# Patient Record
Sex: FEMALE | Race: WHITE | NOT HISPANIC OR LATINO | Employment: OTHER | URBAN - METROPOLITAN AREA
[De-identification: names, ages, dates, MRNs, and addresses within clinical notes are randomized per-mention and may not be internally consistent; named-entity substitution may affect disease eponyms.]

---

## 2017-06-08 ENCOUNTER — TRANSCRIBE ORDERS (OUTPATIENT)
Dept: PHYSICAL THERAPY | Facility: HOSPITAL | Age: 72
End: 2017-06-08

## 2017-06-08 DIAGNOSIS — M54.9 BACK PAIN, UNSPECIFIED BACK LOCATION, UNSPECIFIED BACK PAIN LATERALITY, UNSPECIFIED CHRONICITY: Primary | ICD-10-CM

## 2017-07-07 ENCOUNTER — HOSPITAL ENCOUNTER (OUTPATIENT)
Dept: PHYSICAL THERAPY | Facility: HOSPITAL | Age: 72
Setting detail: THERAPIES SERIES
Discharge: HOME | End: 2017-07-07

## 2017-07-07 DIAGNOSIS — M54.42 ACUTE LEFT-SIDED LOW BACK PAIN WITH LEFT-SIDED SCIATICA: Primary | ICD-10-CM

## 2017-07-07 PROCEDURE — 97162 PT EVAL MOD COMPLEX 30 MIN: CPT | Performed by: PHYSICAL THERAPIST

## 2017-07-07 PROCEDURE — G8979 MOBILITY GOAL STATUS: HCPCS | Performed by: PHYSICAL THERAPIST

## 2017-07-07 PROCEDURE — G8978 MOBILITY CURRENT STATUS: HCPCS | Performed by: PHYSICAL THERAPIST

## 2017-07-07 PROCEDURE — 97110 THERAPEUTIC EXERCISES: CPT | Performed by: PHYSICAL THERAPIST

## 2017-07-07 NOTE — THERAPY TREATMENT NOTE
Outpatient Physical Therapy Ortho Initial Evaluation  [facility]     Patient Name: Nilsa Lee  : 1945  MRN: [id number]  Today's Date: 2017      Visit Date: 2017    There is no problem list on file for this patient.       No past medical history on file.     No past surgical history on file.    Visit Dx:     ICD-10-CM ICD-9-CM   1. Acute left-sided low back pain with left-sided sciatica M54.42 724.2     724.3             Patient History       17 0900          History    Chief Complaint Pain  -DM      Type of Pain Back pain   with some left hip pain  -DM      Date Current Problem(s) Began 17  -DM      Brief Description of Current Complaint While in [place], fell in the tube while getting onto the train.  As the train began moving she had not gotten to her seat.  She fell onto her left hip and had immediate back pain.  She was assisted up and able to walk off the train.  Took 2 Advil with some decrease in pain.  They continued on their thrip for 2 1/2 more weeks.  Returned  and went to MD on  and got referral for PT.   -DM      Patient/Caregiver Goals Relieve pain;Improve mobility;Return to prior level of function;Know what to do to help the symptoms  -DM      Current Tobacco Use none  -DM      Smoking Status none  -DM      Patient's Rating of General Health Good  -DM      How has patient tried to help current problem? Took Advil.  -DM      Pain     Pain Location Back;Hip   More on the left than ri  ght.  -DM      Pain at Present 4  -DM      Pain at Best 0  -DM      Pain at Worst 5  -DM      What Performance Factors Make the Current Problem(s) WORSE? Moving around, worse after being sedentary, some pain on left hip with stairs ( lives in quad level house)  -DM      What Performance Factors Make the Current Problem(s) BETTER? Rest  -DM      Is your sleep disturbed? No   takes a while to get settled.  -DM      What position do you sleep in? Supine   or sidelying with  legs stacked.  -DM      Fall Risk Assessment    Any falls in the past year: Yes  -DM      Number of falls reported in the last 12 months 1  -DM      Factors that contributed to the fall: --   on a train that moved prior to sitting.  -DM      Does patient have a fear of falling No  -DM      Daily Activities    Are you able to read Yes  -DM      Are you able to write Yes  -DM      How does patient learn best? Listening;Reading;Demonstration;Pictures/Video  -DM      Teaching needs identified Home Exercise Program;Management of Condition  -DM      Does patient have problems with the following? None  -DM      Barriers to learning None  -DM      Pt Participated in POC and Goals Yes  -DM      Safety    Are you being hurt, hit, or frightened by anyone at home or in your life? No  -DM      Are you being neglected by a caregiver No  -DM        User Key  (r) = Recorded By, (t) = Taken By, (c) = Cosigned By    Initials Name Provider Type    DM Génesis Yeung, PT Physical Therapist                PT Ortho       07/07/17 0900    Posture/Observations    Posture/Observations Comments FH, rounded shoulders, decreased lordosis  -DM    DTR- Lower Quarter Clearing    Patellar tendon (L2-4) Bilateral:;2- Normal response  -DM    Achilles tendon (S1-2) Bilateral:;1- Minimal response  -DM    Neural Tension Signs- Lower Quarter Clearing    Slump Negative  -DM    Well Slump Negative  -DM    SLR Negative  -DM    Prone knee flexion Negative  -DM    Myotomal Screen- Lower Quarter Clearing    Hip flexion (L2) 4- (Good -)  -DM    Knee extension (L3) 4 (Good)  -DM    Ankle DF (L4) 5 (Normal)  -DM    Great toe extension (L5) 5 (Normal)  -DM    Ankle PF (S1) 5 (Normal)  -DM    Knee flexion (S2) 4 (Good)  -DM    Cervical Palpation    Rhomboids Left:;Guarded/taut;Tender  -DM    Lower Traps Left:;Tender;Guarded/taut  -DM    ROM (Range of Motion)    General ROM no range of motion deficits identified   in hip  -DM    Trunk    Flexion AROM --   decreased  50%  -DM    Extension AROM --   decreased 25% and felt good  -DM    Left Lateral Flexion AROM --   Decreased 50% with stiffness  -DM    Right Lateral Flexion AROM --   Decreased 50% with stiffness  -DM      User Key  (r) = Recorded By, (t) = Taken By, (c) = Cosigned By    Initials Name Provider Type    ALEJANDRA Yeung, PT Physical Therapist                                PT OP Goals       07/07/17 1600       PT Short Term Goals    STG Date to Achieve 08/05/17  -DM     STG 1 Pt will be independent with initial ROM and flexibility for improved mobility with function.  -DM     STG 1 Progress New  -DM     STG 2 Pt will report no radicular symptoms with all activities.  -DM     STG 2 Progress New  -DM     STG 3 Pt will be able to perform self care tasks without increased pain.  -DM     STG 3 Progress New  -DM     Long Term Goals    LTG Date to Achieve 08/14/17  -DM     LTG 1 Pt will be independent with lumbar stabilization and LE strengthening for return to her previous activity level.  -DM     LTG 1 Progress New  -DM     LTG 2 Pt will demonstrate improved lumbar ROM by 25% for ease with ADL's and function.  -DM     LTG 2 Progress New  -DM     LTG 3 Pt will return to walking community distances without increased pain.  -DM     LTG 3 Progress New  -DM     LTG 4 Pt will report improved function via Oswestry from 38% to 19% or less disability.  -DM     LTG 4 Progress New  -DM     Time Calculation    PT Goal Re-Cert Due Date 08/05/17  -DM       User Key  (r) = Recorded By, (t) = Taken By, (c) = Cosigned By    Initials Name Provider Type    ALEJANDRA Yeung, PT Physical Therapist                PT Assessment/Plan       07/07/17 1624       PT Assessment    Functional Limitations Limitations in community activities;Limitations in functional capacity and performance;Performance in leisure activities;Performance in self-care ADL  -DM     Impairments Pain;Poor body mechanics;Muscle strength;Range of motion  -DM     Please refer  to paper survey for additional self-reported information Yes  -DM     Rehab Potential Good  -DM     Patient/caregiver participated in establishment of treatment plan and goals Yes  -DM     Patient would benefit from skilled therapy intervention Yes  -DM     PT Plan    PT Frequency 2x/week  -DM     Predicted Duration of Therapy Intervention (days/wks) 4-5 weeks  -DM     Planned CPT's? PT EVAL MOD COMPLELITY: 64108;PT THER PROC EA 15 MIN: 78172;PT HOT OR COLD PACK TREAT MCARE;PT ELECTRICAL STIM UNATTEND: ;PT MANUAL THERAPY EA 15 MIN: 79986;PT NEUROMUSC RE-EDUCATION EA 15 MIN: 39566;PT ULTRASOUND EA 15 MIN: 64560  -DM     Physical Therapy Interventions (Optional Details) neuromuscular re-education;modalities;manual therapy techniques;lumbar stabilization;home exercise program;ROM (Range of Motion);strengthening;stretching;taping  -DM     PT Plan Comments Progress in lumbar stabilization exercises.   -DM       User Key  (r) = Recorded By, (t) = Taken By, (c) = Cosigned By    Initials Name Provider Type    ALEJANDRA Yeung, JESSICA Physical Therapist                  Exercises       07/07/17 1200          Exercise 1    Exercise Name 1 Instructed in and performed LTR, Bridging, SKTC, supine piriformis stretch,  figure 4 piriformis stretch  -DM      Cueing 1 Verbal;Tactile;Demo  -DM        User Key  (r) = Recorded By, (t) = Taken By, (c) = Cosigned By    Initials Name Provider Type    ALEJANDRA Yeung, JESSICA Physical Therapist                              Outcome Measures       07/07/17 1600          Modified Oswestry    Modified Oswestry Score/Comments 38%  -DM      Functional Assessment    Outcome Measure Options Modifed Owestry  -DM        User Key  (r) = Recorded By, (t) = Taken By, (c) = Cosigned By    Initials Name Provider Type    ALEJANDRA Yeung, PT Physical Therapist            Time Calculation:   Start Time: 0900  Stop Time: 0945  Time Calculation (min): 45 min     Therapy Charges for Today     Code Description  Service Date Service Provider Modifiers Qty    75669829930 HC PT MOBILITY CURRENT 7/7/2017 Génesis Yeung, PT GP, CJ 1    39107081511 HC PT MOBILITY PROJECTED 7/7/2017 Génesis Yeung, PT GP, CI 1    17412329431 HC PT EVAL MOD COMPLEXITY 2 7/7/2017 Génesis Yeung, PT GP 1    16470161133 HC PT THER PROC EA 15 MIN 7/7/2017 Génesis Yeung, PT GP 1          PT G-Codes  Outcome Measure Options: Modifed Owestry  Score: 38%  Functional Limitation: Mobility: Walking and moving around  Mobility: Walking and Moving Around Current Status (): At least 20 percent but less than 40 percent impaired, limited or restricted  Mobility: Walking and Moving Around Goal Status (): At least 1 percent but less than 20 percent impaired, limited or restricted         Génesis Yeung, PT  7/7/2017

## 2017-07-10 ENCOUNTER — HOSPITAL ENCOUNTER (OUTPATIENT)
Dept: PHYSICAL THERAPY | Facility: HOSPITAL | Age: 72
Setting detail: THERAPIES SERIES
Discharge: HOME | End: 2017-07-10

## 2017-07-10 DIAGNOSIS — M54.42 ACUTE LEFT-SIDED LOW BACK PAIN WITH LEFT-SIDED SCIATICA: Primary | ICD-10-CM

## 2017-07-10 PROCEDURE — 97110 THERAPEUTIC EXERCISES: CPT | Performed by: PHYSICAL THERAPIST

## 2017-07-10 NOTE — THERAPY TREATMENT NOTE
Outpatient Physical Therapy Ortho Treatment Note  [facility]     Patient Name: Nilsa Lee  : 1945  MRN: [id number]  Today's Date: 7/10/2017      Visit Date: 07/10/2017    Visit Dx:    ICD-10-CM ICD-9-CM   1. Acute left-sided low back pain with left-sided sciatica M54.42 724.2     724.3       There is no problem list on file for this patient.       No past medical history on file.     No past surgical history on file.                          PT Assessment/Plan       07/10/17 1302       PT Assessment    Assessment Comments Nilsa demonstrated good technique with HEP and good understaning of new strengthening exercises.    -DM     PT Plan    PT Plan Comments cont with current exercise program.  Assess new exercises for technique.  -DM       User Key  (r) = Recorded By, (t) = Taken By, (c) = Cosigned By    Initials Name Provider Type    ALEJANDRA Yeung, PT Physical Therapist                Modalities       07/10/17 1200          Moist Heat    MH Applied Yes  -DM      Location mid to low back while in supine hooklying  -DM      Rx Minutes 15 mins  -DM      MH S/P Rx Yes  -DM        User Key  (r) = Recorded By, (t) = Taken By, (c) = Cosigned By    Initials Name Provider Type    ALEJANDRA Yeung, PT Physical Therapist                Exercises       07/10/17 1200          Subjective Comments    Subjective Comments Still with left sided mid back pain.  Not as much in left hip or low back.  The exercises are helping decrease the pain in the back.   -DM      Exercise 1    Exercise Name 1 Reviewed HEP.  Adjusted bridges to doing with arms crossed at the chest and replaced with DKTC.  -DM      Cueing 1 Verbal;Tactile  -DM      Exercise 2    Exercise Name 2 Progressed in strengthening as on flow sheet.  -DM      Cueing 2 Verbal;Tactile;Demo  -DM        User Key  (r) = Recorded By, (t) = Taken By, (c) = Cosigned By    Initials Name Provider Type    ALEJANDRA Yeung, JESSICA Physical Therapist                                        Outcome Measures       07/07/17 1600          Modified Oswestry    Modified Oswestry Score/Comments 38%  -DM      Functional Assessment    Outcome Measure Options Modifed Owestry  -DM        User Key  (r) = Recorded By, (t) = Taken By, (c) = Cosigned By    Initials Name Provider Type    ALEJANDRA Yeung, PT Physical Therapist            Time Calculation:   Start Time: 1200  Stop Time: 1245  Time Calculation (min): 45 min    Therapy Charges for Today     Code Description Service Date Service Provider Modifiers Qty    26074488672 HC PT THER PROC EA 15 MIN 7/10/2017 Génesis Yeung, PT GP 2    14024185765 HC PT HOT OR COLD PACK TREAT MCARE 7/10/2017 Géneiss Yeung, PT GP 1                    Génesis Yeung, PT  7/10/2017

## 2017-07-14 ENCOUNTER — HOSPITAL ENCOUNTER (OUTPATIENT)
Dept: PHYSICAL THERAPY | Facility: HOSPITAL | Age: 72
Setting detail: THERAPIES SERIES
Discharge: HOME | End: 2017-07-14

## 2017-07-14 DIAGNOSIS — M54.42 ACUTE LEFT-SIDED LOW BACK PAIN WITH LEFT-SIDED SCIATICA: Primary | ICD-10-CM

## 2017-07-14 DIAGNOSIS — M54.42 ACUTE LEFT-SIDED LOW BACK PAIN WITH LEFT-SIDED SCIATICA: ICD-10-CM

## 2017-07-14 PROCEDURE — 97110 THERAPEUTIC EXERCISES: CPT | Performed by: PHYSICAL THERAPIST

## 2017-07-14 PROCEDURE — 97035 APP MDLTY 1+ULTRASOUND EA 15: CPT | Performed by: PHYSICAL THERAPIST

## 2017-07-14 PROCEDURE — 97140 MANUAL THERAPY 1/> REGIONS: CPT | Performed by: PHYSICAL THERAPIST

## 2017-07-14 NOTE — THERAPY TREATMENT NOTE
Outpatient Physical Therapy Ortho Treatment Note  [facility]     Patient Name: Nilsa Lee  : 1945  MRN: [id number]  Today's Date: 2017      Visit Date: 2017    Visit Dx:    ICD-10-CM ICD-9-CM   1. Acute left-sided low back pain with left-sided sciatica M54.42 724.2     724.3       There is no problem list on file for this patient.       No past medical history on file.     No past surgical history on file.                          PT Assessment/Plan       17 1355       PT Assessment    Assessment Comments Nilsa is having less pain and minimal radicular symptoms.  She did experience some discomfort with manual techniques but was painfree at end of treatment today.   -DM     PT Plan    PT Plan Comments Cont with exercise, manual techniques and modalities prn.   -DM       User Key  (r) = Recorded By, (t) = Taken By, (c) = Cosigned By    Initials Name Provider Type    ALEJANDRA Yeung, PT Physical Therapist                Modalities       17 0700          Subjective Comments    Subjective Comments Increased pain after sitting for a while mostly in left lower thoracic pain with some residual discomfort in the left lower back.  -DM      Ultrasound 17644    Location left lower thoracic/upper lumbar region  -DM      Rx Minutes 10  -DM      Duty Cycle 100  -DM      Frequency 3.0 MHz  -DM      Intensity - Wts/cm 1.5  -DM      Phonophoresis No  -DM        User Key  (r) = Recorded By, (t) = Taken By, (c) = Cosigned By    Initials Name Provider Type    ALEJANDRA Yeung, PT Physical Therapist                Exercises       17 0700          Subjective Comments    Subjective Comments Increased pain after sitting for a while mostly in left lower thoracic pain with some residual discomfort in the left lower back.  -DM      Exercise 1    Exercise Name 1 Reviewed HEP.    -DM      Exercise 2    Exercise Name 2 Progressed with GAEL and supine quadratus lumborum stretch as on handout   -DM      Cueing 2 Verbal;Tactile;Demo  -DM        User Key  (r) = Recorded By, (t) = Taken By, (c) = Cosigned By    Initials Name Provider Type    ALEJANDRA Yeung, PT Physical Therapist                        Manual Rx (last 36 hours)      Manual Treatments       07/14/17 0900          Manual Rx 1    Manual Rx 1 Location left lower thoracic/lumbar region  -DM      Manual Rx 1 Type STM  -DM      Manual Rx 1 Duration 15 minutes  -DM      Manual Rx 2    Manual Rx 2 Location thoracic/lumbar region  -DM      Manual Rx 2 Type grade I/II PA mobilizations  -DM      Manual Rx 2 Duration 15  -DM        User Key  (r) = Recorded By, (t) = Taken By, (c) = Cosigned By    Initials Name Provider Type    ALEJANDRA Yeung, PT Physical Therapist                PT OP Goals       07/14/17 1300       PT Short Term Goals    STG 1 Pt will be independent with initial ROM and flexibility for improved mobility with function.  -DM     STG 1 Progress Met  -DM     STG 2 Pt will report no radicular symptoms with all activities.  -DM     STG 2 Progress Progressing  -DM     STG 2 Progress Comments Pain is primarily in her mid back and left buttock but does not go past the upper buttock.   -DM     STG 3 Pt will be able to perform self care tasks without increased pain.  -DM     STG 3 Progress Progressing  -DM     Long Term Goals    LTG 1 Pt will be independent with lumbar stabilization and LE strengthening for return to her previous activity level.  -DM     LTG 1 Progress Ongoing  -DM     LTG 2 Pt will demonstrate improved lumbar ROM by 25% for ease with ADL's and function.  -DM     LTG 2 Progress Ongoing  -DM     LTG 3 Pt will return to walking community distances without increased pain.  -DM     LTG 3 Progress Ongoing  -DM     LTG 4 Pt will report improved function via Oswestry from 38% to 19% or less disability.  -DM     LTG 4 Progress Ongoing  -DM       User Key  (r) = Recorded By, (t) = Taken By, (c) = Cosigned By    Initials Name Provider  Type    DM Génesis Yeung, PT Physical Therapist                    Time Calculation:   Start Time: 0730  Stop Time: 0815  Time Calculation (min): 45 min    Therapy Charges for Today     Code Description Service Date Service Provider Modifiers Qty    14666341966 HC PT ULTRASOUND EA 15 MIN 7/14/2017 Génesis Yeung, PT GP 1    32183681819 HC PT MANUAL THERAPY EA 15 MIN 7/14/2017 Génesis Yeung, PT GP 1    94562004428 HC PT THER PROC EA 15 MIN 7/14/2017 Génesis Yeung, PT GP 1                    Génesis Yeung, PT  7/14/2017

## 2017-07-17 ENCOUNTER — HOSPITAL ENCOUNTER (OUTPATIENT)
Dept: PHYSICAL THERAPY | Facility: HOSPITAL | Age: 72
Setting detail: THERAPIES SERIES
Discharge: HOME | End: 2017-07-17

## 2017-07-17 PROCEDURE — 97140 MANUAL THERAPY 1/> REGIONS: CPT | Performed by: PHYSICAL THERAPIST

## 2017-07-17 PROCEDURE — 97035 APP MDLTY 1+ULTRASOUND EA 15: CPT | Performed by: PHYSICAL THERAPIST

## 2017-07-17 NOTE — THERAPY TREATMENT NOTE
"    Outpatient Physical Therapy Ortho Treatment Note  [facility]     Patient Name: Nilsa Lee  : 1945  MRN: [id number]  Today's Date: 2017      Visit Date: 2017    Visit Dx:  No diagnosis found.    There is no problem list on file for this patient.       No past medical history on file.     No past surgical history on file.                          PT Assessment/Plan       17 1617       PT Assessment    Assessment Comments Patient with positive response to modalities and manual techniques last session.  Reports compliance with HEP.  She did have some mild discomfort during manual but noted no pain afterward and stated \"feel like a 12 year old\" with mobility immediately post session.   -RA     PT Plan    PT Plan Comments Cotinue with manual techniques, modalities as beneficial.  Progress ther ex for core/lumbar stabilization as appropriate.  -RA       User Key  (r) = Recorded By, (t) = Taken By, (c) = Cosigned By    Initials Name Provider Type    TAYLOR Salazar, PT Physical Therapist                Modalities       17 1500          Subjective Comments    Subjective Comments What she did last time really helped.  It was uncomfortable during but when I got up afterwards and started walking, \"I felt like a 12 year old.\" Of course, I've stiffened up some again but overall I'm making progress    -RA      Subjective Pain    Able to rate subjective pain? yes  -RA      Ultrasound 33794    Location left lower thoracic/upper lumbar region  -RA      Rx Minutes 10  -RA      Duty Cycle 100  -RA      Frequency 3.0 MHz  -RA      Intensity - Wts/cm 1.5  -RA      Phonophoresis No  -RA        User Key  (r) = Recorded By, (t) = Taken By, (c) = Cosigned By    Initials Name Provider Type    TAYLOR Salazar, PT Physical Therapist                Exercises       17 1500          Subjective Comments    Subjective Comments What she did last time really helped.  It was uncomfortable during " "but when I got up afterwards and started walking, \"I felt like a 12 year old.\" Of course, I've stiffened up some again but overall I'm making progress    -RA      Subjective Pain    Able to rate subjective pain? yes  -RA      Exercise 1    Exercise Name 1 Reviewed QL stretch and GAEL HEP.    -RA        User Key  (r) = Recorded By, (t) = Taken By, (c) = Cosigned By    Initials Name Provider Type    TAYLOR Salazar PT Physical Therapist                        Manual Rx (last 36 hours)      Manual Treatments       07/17/17 1500          Manual Rx 1    Manual Rx 1 Location left lower thoracic/lumbar region  -RA      Manual Rx 1 Type STM, myofascial tissue elongation   -RA      Manual Rx 2    Manual Rx 2 Location thoracic/lumbar region  -RA      Manual Rx 2 Type grade I/II PA mobilizations  -RA      Manual Rx 2 Duration total manual 28 minutes  -RA        User Key  (r) = Recorded By, (t) = Taken By, (c) = Cosigned By    Initials Name Provider Type    TAYLOR Salazar PT Physical Therapist                    Therapy Education       07/17/17 1616          Therapy Education    Given Symptoms/condition management;Posture/body mechanics;HEP;Mobility training  -RA      Program Reinforced  -RA      How Provided Verbal;Demonstration  -RA      Provided to Patient  -RA      Level of Understanding Teach back education performed;Verbalized  -RA        User Key  (r) = Recorded By, (t) = Taken By, (c) = Cosigned By    Initials Name Provider Type    TAYLOR Salazar PT Physical Therapist                Time Calculation:   Start Time: 1505  Stop Time: 1550  Time Calculation (min): 45 min    Therapy Charges for Today     Code Description Service Date Service Provider Modifiers Qty    19221389092 HC PT MANUAL THERAPY EA 15 MIN 7/17/2017 Cheryle Salazar, PT GP 2    67571832156 HC PT ULTRASOUND EA 15 MIN 7/17/2017 Cheryle Salazar PT GP 1                    Cheryle Salazar PT  7/17/2017     "

## 2017-07-21 ENCOUNTER — HOSPITAL ENCOUNTER (OUTPATIENT)
Dept: PHYSICAL THERAPY | Facility: HOSPITAL | Age: 72
Setting detail: THERAPIES SERIES
Discharge: HOME | End: 2017-07-21

## 2017-07-21 DIAGNOSIS — M54.42 ACUTE LEFT-SIDED LOW BACK PAIN WITH LEFT-SIDED SCIATICA: Primary | ICD-10-CM

## 2017-07-21 PROCEDURE — 97035 APP MDLTY 1+ULTRASOUND EA 15: CPT | Performed by: PHYSICAL THERAPIST

## 2017-07-21 PROCEDURE — 97140 MANUAL THERAPY 1/> REGIONS: CPT | Performed by: PHYSICAL THERAPIST

## 2017-07-21 NOTE — THERAPY TREATMENT NOTE
Outpatient Physical Therapy Ortho Treatment Note  [facility]     Patient Name: Nilsa Lee  : 1945  MRN: [id number]  Today's Date: 2017      Visit Date: 2017    Visit Dx:    ICD-10-CM ICD-9-CM   1. Acute left-sided low back pain with left-sided sciatica M54.42 724.2     724.3       There is no problem list on file for this patient.       No past medical history on file.     No past surgical history on file.                          PT Assessment/Plan       17 0858       PT Assessment    Assessment Comments No thoracic pain today with mild LBP which was originating from her left SI.  Following MET to SI she was painfree.  Ambulated on e lap on track and remained painfree.  -DM     PT Plan    PT Plan Comments Reassess. If still painfree consider DC.  -DM       User Key  (r) = Recorded By, (t) = Taken By, (c) = Cosigned By    Initials Name Provider Type    ALEJANDRA Yeung, PT Physical Therapist                Modalities       17 0700          Subjective Comments    Subjective Comments No mid back pain but does have left LBP  -DM      Subjective Pain    Able to rate subjective pain? yes  -DM      Pre-Treatment Pain Level 2  -DM      Post-Treatment Pain Level 0  -DM      Ultrasound 54939    Location Left SI while in right sidelying  -DM      Rx Minutes 10  -DM      Duty Cycle 100  -DM      Frequency 1.0 MHz  -DM      Intensity - Wts/cm 1.5  -DM      Phonophoresis No  -DM        User Key  (r) = Recorded By, (t) = Taken By, (c) = Cosigned By    Initials Name Provider Type    ALEJANDRA Yeung, PT Physical Therapist                Exercises       17 0700          Subjective Comments    Subjective Comments No mid back pain but does have left LBP  -DM      Subjective Pain    Able to rate subjective pain? yes  -DM      Pre-Treatment Pain Level 2  -DM      Post-Treatment Pain Level 0  -DM        User Key  (r) = Recorded By, (t) = Taken By, (c) = Cosigned By    Initials Name  Provider Type    ALEJANDRA Yeung, PT Physical Therapist                        Manual Rx (last 36 hours)      Manual Treatments       07/21/17 0700          Manual Rx 1    Manual Rx 1 Location Presents with positive standing and sitting flexion test on left.    -DM      Manual Rx 1 Type MET for pubis correction with low grade audible click followed by MET for backward torsion -> level SI and pain free.  -DM      Manual Rx 1 Duration 15 minutes  -DM        User Key  (r) = Recorded By, (t) = Taken By, (c) = Cosigned By    Initials Name Provider Type    ALEJANDRA Yeung PT Physical Therapist                        Time Calculation:   Start Time: 0815  Stop Time: 0845  Time Calculation (min): 30 min    Therapy Charges for Today     Code Description Service Date Service Provider Modifiers Qty    95233141823 HC PT MANUAL THERAPY EA 15 MIN 7/21/2017 Génesis Yeung, PT GP 1    76462666973 HC PT ULTRASOUND EA 15 MIN 7/21/2017 Génesis Yeung, PT GP 1                    Génesis Yeung, PT  7/21/2017

## 2017-07-25 ENCOUNTER — APPOINTMENT (OUTPATIENT)
Dept: PHYSICAL THERAPY | Facility: HOSPITAL | Age: 72
End: 2017-07-25

## 2017-07-25 ENCOUNTER — HOSPITAL ENCOUNTER (OUTPATIENT)
Dept: PHYSICAL THERAPY | Facility: HOSPITAL | Age: 72
Setting detail: THERAPIES SERIES
Discharge: HOME | End: 2017-07-25

## 2017-07-25 DIAGNOSIS — M54.42 ACUTE LEFT-SIDED LOW BACK PAIN WITH LEFT-SIDED SCIATICA: Primary | ICD-10-CM

## 2017-07-25 PROCEDURE — 97140 MANUAL THERAPY 1/> REGIONS: CPT | Performed by: PHYSICAL THERAPIST

## 2017-07-25 PROCEDURE — 97035 APP MDLTY 1+ULTRASOUND EA 15: CPT | Performed by: PHYSICAL THERAPIST

## 2017-07-25 NOTE — THERAPY TREATMENT NOTE
"    Outpatient Physical Therapy Ortho Treatment Note  [facility]     Patient Name: Nilsa Lee  : 1945  MRN: [id number]  Today's Date: 2017      Visit Date: 2017    Visit Dx:    ICD-10-CM ICD-9-CM   1. Acute left-sided low back pain with left-sided sciatica M54.42 724.2     724.3       There is no problem list on file for this patient.       No past medical history on file.     No past surgical history on file.                          PT Assessment/Plan       17 1241       PT Assessment    Assessment Comments SI is stable and no longer painful.  She still has residual quadratus lumborum and erector spinae spasm.  -DM     PT Plan    PT Plan Comments Cont with manual work on residual soft tissue issues.   -DM       User Key  (r) = Recorded By, (t) = Taken By, (c) = Cosigned By    Initials Name Provider Type    ALEJANDRA Yeung, PT Physical Therapist                Modalities       17 1100          Subjective Comments    Subjective Comments No longer has \"hot\" spots in her back but has lingering ache in her left lumbar region especially with prolonged standing or sitting.   -DM      Subjective Pain    Able to rate subjective pain? yes  -DM      Pre-Treatment Pain Level 2  -DM      Post-Treatment Pain Level 0  -DM      Ultrasound [id number]    Location Left lumbar region while in right sidelying  -DM      Rx Minutes 10  -DM      Duty Cycle 100  -DM      Frequency 1.0 MHz  -DM      Intensity - Wts/cm 1.5  -DM      Phonophoresis Yes  -DM        User Key  (r) = Recorded By, (t) = Taken By, (c) = Cosigned By    Initials Name Provider Type    ALEJANDRA Yeung, PT Physical Therapist                Exercises       17 1100          Subjective Comments    Subjective Comments No longer has \"hot\" spots in her back but has lingering ache in her left lumbar region especially with prolonged standing or sitting.   -DM      Subjective Pain    Able to rate subjective pain? yes  -DM      " Pre-Treatment Pain Level 2  -DM      Post-Treatment Pain Level 0  -DM        User Key  (r) = Recorded By, (t) = Taken By, (c) = Cosigned By    Initials Name Provider Type    ALEJANDRA Yeung, JESSICA Physical Therapist                        Manual Rx (last 36 hours)      Manual Treatments       07/25/17 1100          Manual Rx 1    Manual Rx 1 Location Left lumbar musculature  -DM      Manual Rx 1 Type STM/myofascial release  -DM      Manual Rx 1 Duration 20  -DM      Manual Rx 2    Manual Rx 2 Location Passive stretchng of quadratus lumborum  -DM      Manual Rx 2 Duration 10  -DM        User Key  (r) = Recorded By, (t) = Taken By, (c) = Cosigned By    Initials Name Provider Type    ALEJANDRA Yeung PT Physical Therapist                        Time Calculation:   Start Time: 1030  Stop Time: 1115  Time Calculation (min): 45 min    Therapy Charges for Today     Code Description Service Date Service Provider Modifiers Qty    63876285649 HC PT MANUAL THERAPY EA 15 MIN 7/25/2017 Génesis Yeung, PT GP 2    56965449601 HC PT ULTRASOUND EA 15 MIN 7/25/2017 Génesis Yeung PT GP 1                    Génesis Yeung, PT  7/25/2017

## 2017-08-01 ENCOUNTER — APPOINTMENT (OUTPATIENT)
Dept: PHYSICAL THERAPY | Facility: HOSPITAL | Age: 72
End: 2017-08-01

## 2017-08-03 ENCOUNTER — HOSPITAL ENCOUNTER (OUTPATIENT)
Dept: PHYSICAL THERAPY | Facility: HOSPITAL | Age: 72
Setting detail: THERAPIES SERIES
Discharge: HOME | End: 2017-08-03

## 2017-08-03 DIAGNOSIS — M54.42 ACUTE LEFT-SIDED LOW BACK PAIN WITH LEFT-SIDED SCIATICA: Primary | ICD-10-CM

## 2017-08-03 PROCEDURE — G8980 MOBILITY D/C STATUS: HCPCS | Performed by: PHYSICAL THERAPIST

## 2017-08-03 PROCEDURE — 97035 APP MDLTY 1+ULTRASOUND EA 15: CPT | Performed by: PHYSICAL THERAPIST

## 2017-08-03 PROCEDURE — 97140 MANUAL THERAPY 1/> REGIONS: CPT | Performed by: PHYSICAL THERAPIST

## 2017-08-03 PROCEDURE — G8979 MOBILITY GOAL STATUS: HCPCS | Performed by: PHYSICAL THERAPIST

## 2017-08-03 NOTE — THERAPY DISCHARGE NOTE
Outpatient Physical Therapy Ortho Treatment Note/Discharge Summary  [facility]     Patient Name: Nilsa Lee  : 1945  MRN: [id number]  Today's Date: 8/3/2017    Visit Date: 2017  Visit Dx:    ICD-10-CM ICD-9-CM   1. Acute left-sided low back pain with left-sided sciatica M54.42 724.2     724.3     There is no problem list on file for this patient.    No past medical history on file. No past surgical history on file.          PT Ortho       17 1500    Subjective Comments    Subjective Comments She is doing much better.  Having some stiffness in the left side of the back in the mornings but can walk it off pretty easily.   -DM    Subjective Pain    Able to rate subjective pain? yes  -DM    Pre-Treatment Pain Level 0  -DM      User Key  (r) = Recorded By, (t) = Taken By, (c) = Cosigned By    Initials Name Provider Type    ALEJANDRA Yeung, PT Physical Therapist                PT Assessment/Plan       17 1615       PT Assessment    Assessment Comments Nilsa Lee completed 7 sessions of physical therapy for left LBP.  She now has full ROM, level SI, minimal palpable tenderness and is only painful in the mornings.  Her pain is short-lived and is eased with walking.  She is I with her HEP and should continue.  She is discharged at this time.   -DM     Please refer to paper survey for additional self-reported information Yes  -DM       User Key  (r) = Recorded By, (t) = Taken By, (c) = Cosigned By    Initials Name Provider Type    ALEJANDRA Yeung, PT Physical Therapist                Modalities       17 1500          Subjective Pain    Post-Treatment Pain Level 0  -DM      Ultrasound [id number]    Location Left lumbar/sacral region while in right sidelying  -DM      Rx Minutes 10  -DM      Duty Cycle 100  -DM      Frequency 1.0 MHz  -DM      Intensity - Wts/cm 1.5  -DM      Phonophoresis Yes  -DM        User Key  (r) = Recorded By, (t) = Taken By, (c) = Cosigned By    Initials  Name Provider Type    ALEJANDRA Yeung, PT Physical Therapist                Exercises       08/03/17 1500          Subjective Comments    Subjective Comments She is doing much better.  Having some stiffness in the left side of the back in the mornings but can walk it off pretty easily.   -DM      Subjective Pain    Able to rate subjective pain? yes  -DM      Pre-Treatment Pain Level 0  -DM      Post-Treatment Pain Level 0  -DM        User Key  (r) = Recorded By, (t) = Taken By, (c) = Cosigned By    Initials Name Provider Type    ALEJANDRA Yeung, PT Physical Therapist             Manual Rx (last 36 hours)      Manual Treatments       08/03/17 1500          Manual Rx 1    Manual Rx 1 Location Left lumbar musculature  -DM      Manual Rx 1 Type STM/myofascial release  -DM      Manual Rx 1 Duration 30  -DM        User Key  (r) = Recorded By, (t) = Taken By, (c) = Cosigned By    Initials Name Provider Type    ALEJANDRA Yeung, PT Physical Therapist                PT OP Goals       08/03/17 1500       PT Short Term Goals    STG 1 Pt will be independent with initial ROM and flexibility for improved mobility with function.  -DM     STG 1 Progress Met  -DM     STG 2 Pt will report no radicular symptoms with all activities.  -DM     STG 2 Progress Met  -DM     STG 3 Pt will be able to perform self care tasks without increased pain.  -DM     STG 3 Progress Met  -DM     Long Term Goals    LTG 1 Pt will be independent with lumbar stabilization and LE strengthening for return to her previous activity level.  -DM     LTG 1 Progress Met  -DM     LTG 2 Pt will demonstrate improved lumbar ROM by 25% for ease with ADL's and function.  -DM     LTG 2 Progress Met  -DM     LTG 3 Pt will return to walking community distances without increased pain.  -DM     LTG 3 Progress Met  -DM     LTG 4 Pt will report improved function via Oswestry from 38% to 19% or less disability.  -DM     LTG 4 Progress Met  -DM     LTG 4 Progress Comments 10%   -DM       User Key  (r) = Recorded By, (t) = Taken By, (c) = Cosigned By    Initials Name Provider Type    DM Génesis Yeung, PT Physical Therapist                    Outcome Measures       08/03/17 1600          Modified Oswestry    Modified Oswestry Score/Comments 10%  -DM      Functional Assessment    Outcome Measure Options Modifed Owestry  -DM        User Key  (r) = Recorded By, (t) = Taken By, (c) = Cosigned By    Initials Name Provider Type    DM Génesis Yeung PT Physical Therapist      Time Calculation:   Start Time: 1515  Stop Time: 1600  Time Calculation (min): 45 min    Therapy Charges for Today     Code Description Service Date Service Provider Modifiers Qty    05499852318 HC PT MOBILITY PROJECTED 8/3/2017 Génesis Yeung, PT GP, CI 1    35298861298 HC PT MOBILITY DISCHARGE 8/3/2017 Génesis Yeung, PT GP, CI 1    39269055704 HC PT ULTRASOUND EA 15 MIN 8/3/2017 Génesis Yeung, PT GP 1    24027332756 HC PT MANUAL THERAPY EA 15 MIN 8/3/2017 Génesis Yeung, PT GP 2        PT G-Codes  Outcome Measure Options: Modifed Owestry  Functional Limitation: Mobility: Walking and moving around  Mobility: Walking and Moving Around Goal Status (): At least 1 percent but less than 20 percent impaired, limited or restricted  Mobility: Walking and Moving Around Discharge Status (): At least 1 percent but less than 20 percent impaired, limited or restricted     OP PT Discharge Summary  Date of Discharge: 08/03/17  Reason for Discharge: All goals achieved  Outcomes Achieved: Able to achieve all goals within established timeline  Discharge Destination: Home with home program  Discharge Instructions: Nilsa Lee completed 7 sessions of physical therapy for left LBP.  She now has full ROM, level SI, minimal palpable tenderness and is only painful in the mornings.  Her pain is short-lived and is eased with walking.  She is I with her HEP and should continue.  She is discharged at this time.    Génesis Yeung  PT  8/3/2017, DPT

## 2017-08-08 ENCOUNTER — APPOINTMENT (OUTPATIENT)
Dept: PHYSICAL THERAPY | Facility: HOSPITAL | Age: 72
End: 2017-08-08

## 2019-06-21 ENCOUNTER — TRANSCRIBE ORDERS (OUTPATIENT)
Dept: ADMINISTRATIVE | Facility: HOSPITAL | Age: 74
End: 2019-06-21

## 2019-06-21 DIAGNOSIS — Z12.31 VISIT FOR SCREENING MAMMOGRAM: Primary | ICD-10-CM

## 2019-06-26 ENCOUNTER — TRANSCRIBE ORDERS (OUTPATIENT)
Dept: ADMINISTRATIVE | Facility: HOSPITAL | Age: 74
End: 2019-06-26

## 2019-06-26 DIAGNOSIS — Z78.0 POSTMENOPAUSAL STATE: Primary | ICD-10-CM

## 2019-07-11 ENCOUNTER — HOSPITAL ENCOUNTER (OUTPATIENT)
Dept: MAMMOGRAPHY | Facility: HOSPITAL | Age: 74
Discharge: HOME | End: 2019-07-11
Admitting: INTERNAL MEDICINE

## 2019-07-11 DIAGNOSIS — Z12.31 VISIT FOR SCREENING MAMMOGRAM: ICD-10-CM

## 2019-07-11 PROCEDURE — 77067 SCR MAMMO BI INCL CAD: CPT

## 2019-07-11 PROCEDURE — 77063 BREAST TOMOSYNTHESIS BI: CPT

## 2019-07-26 ENCOUNTER — HOSPITAL ENCOUNTER (OUTPATIENT)
Dept: BONE DENSITY | Facility: HOSPITAL | Age: 74
Discharge: HOME | End: 2019-07-26
Admitting: INTERNAL MEDICINE

## 2019-07-26 DIAGNOSIS — Z78.0 POSTMENOPAUSAL STATE: ICD-10-CM

## 2019-07-26 PROCEDURE — 77080 DXA BONE DENSITY AXIAL: CPT

## 2021-03-09 DIAGNOSIS — Z23 IMMUNIZATION DUE: ICD-10-CM

## 2022-03-15 ENCOUNTER — TRANSCRIBE ORDERS (OUTPATIENT)
Dept: ADMINISTRATIVE | Facility: HOSPITAL | Age: 77
End: 2022-03-15

## 2022-03-15 DIAGNOSIS — Z78.0 MENOPAUSE: Primary | ICD-10-CM

## 2022-10-03 ENCOUNTER — HOSPITAL ENCOUNTER (OUTPATIENT)
Dept: BONE DENSITY | Facility: HOSPITAL | Age: 77
Discharge: HOME | End: 2022-10-03

## 2022-10-03 ENCOUNTER — HOSPITAL ENCOUNTER (OUTPATIENT)
Dept: MAMMOGRAPHY | Facility: HOSPITAL | Age: 77
Discharge: HOME | End: 2022-10-03

## 2022-10-03 DIAGNOSIS — Z78.0 MENOPAUSE: ICD-10-CM

## 2022-10-03 PROCEDURE — 77067 SCR MAMMO BI INCL CAD: CPT

## 2022-10-03 PROCEDURE — 77080 DXA BONE DENSITY AXIAL: CPT

## 2022-10-03 PROCEDURE — 77063 BREAST TOMOSYNTHESIS BI: CPT

## 2022-10-05 ENCOUNTER — TRANSCRIBE ORDERS (OUTPATIENT)
Dept: ADMINISTRATIVE | Facility: HOSPITAL | Age: 77
End: 2022-10-05

## 2022-10-05 DIAGNOSIS — R92.8 ABNORMAL MAMMOGRAM: Primary | ICD-10-CM

## 2022-10-28 ENCOUNTER — HOSPITAL ENCOUNTER (OUTPATIENT)
Dept: MAMMOGRAPHY | Facility: HOSPITAL | Age: 77
Discharge: HOME | End: 2022-10-28
Admitting: INTERNAL MEDICINE

## 2022-10-28 ENCOUNTER — APPOINTMENT (OUTPATIENT)
Dept: ULTRASOUND IMAGING | Facility: HOSPITAL | Age: 77
End: 2022-10-28

## 2022-10-28 DIAGNOSIS — R92.8 ABNORMAL MAMMOGRAM: ICD-10-CM

## 2022-10-28 PROCEDURE — 77065 DX MAMMO INCL CAD UNI: CPT

## 2022-10-28 PROCEDURE — G0279 TOMOSYNTHESIS, MAMMO: HCPCS
